# Patient Record
(demographics unavailable — no encounter records)

---

## 2024-10-08 NOTE — DISCUSSION/SUMMARY
[de-identified] : General Dx Discussion The patient was advised of the diagnosis. The natural history of the pathology was explained in full to the patient in layman's terms. All questions were answered. The risks and benefits of surgical and non-surgical treatment alternatives were explained in full to the patient.  Case Discussed. Transition out of brace.  Continue PT/ HEP.  f/u 6 weeks.    Entered by ROSY Gutierrez acting as scribe. - The documentation recorded by the scribe accurately reflects the service I personally performed and the decisions made by me.

## 2024-10-08 NOTE — HISTORY OF PRESENT ILLNESS
[0] : 0 [Localized] : localized [Rest] : rest [Physical therapy] : physical therapy [Standing] : standing [Full time] : Work status: full time [de-identified] : Patient is 28yo male who comes in today for follow up of his left knee. He has been wearing the brace. He feels ok. He feels better, but he still has some pain. He is going to PT. Also recently had bloodwork done which was negative for Lyme Disease.   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

## 2024-11-05 NOTE — PHYSICAL EXAM
[Left] : left knee [NL (0)] : extension 0 degrees [5___] : hamstring 5[unfilled]/5 [Negative] : negative anterior draw [] : patient ambulates without assistive device [TWNoteComboBox7] : flexion 120 degrees

## 2024-12-19 NOTE — PHYSICAL EXAM
[Left] : left knee [NL (0)] : extension 0 degrees [5___] : hamstring 5[unfilled]/5 [Negative] : negative anterior draw [] : no pain with varus stress [TWNoteComboBox7] : flexion 120 degrees

## 2024-12-19 NOTE — DISCUSSION/SUMMARY
[de-identified] : General Dx Discussion The patient was advised of the diagnosis. The natural history of the pathology was explained in full to the patient in layman's terms. All questions were answered. The risks and benefits of surgical and non-surgical treatment alternatives were explained in full to the patient.  Case Discussed. He continues to have pain despite conservative treatment. (bracing, medications, PT).   Due to continued pain will get an MR Arthrogram for further evaluation.  f/u after arthrogram.   Entered by Meeta GALLEGOS acting as a scribe. Instructions: Dr. Rivero- The documentation recorded by the scribe accurately reflects the service I personally performed and the decisions made by me.

## 2024-12-19 NOTE — HISTORY OF PRESENT ILLNESS
[7] : 7 [2] : 2 [Burning] : burning [Dull/Aching] : dull/aching [Constant] : constant [Household chores] : household chores [Leisure] : leisure [Work] : work [Full time] : Work status: full time [de-identified] : Patient is here for a follow up on his left knee. He continues to have pain and pressure into his knee. He feels it is unstable. He was only approved for 3 more sessions of PT.  [] : no [FreeTextEntry1] : Left knee [FreeTextEntry9] : knee sleeve [de-identified] : activity [de-identified] : HEP

## 2025-01-14 NOTE — DATA REVIEWED
[MRI] : MRI [Left] : left [Knee] : knee [Report was reviewed and noted in the chart] : The report was reviewed and noted in the chart [FreeTextEntry1] : Mild lateral subluxation of the patella with soft tissue edema in the superolateral aspect of Hoffa's fat pad. These findings may be seen in the setting of patellar maltracking.   No evidence for meniscal or ligament tears

## 2025-01-14 NOTE — HISTORY OF PRESENT ILLNESS
[4] : 4 [0] : 0 [Localized] : localized [Rest] : rest [Full time] : Work status: full time [de-identified] : Here for f/u left knee arthrogram results. He is feeling a little better. He stopped wearing the brace at work.  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

## 2025-01-14 NOTE — DISCUSSION/SUMMARY
[de-identified] : General Dx Discussion The patient was advised of the diagnosis. The natural history of the pathology was explained in full to the patient in layman's terms. All questions were answered. The risks and benefits of surgical and non-surgical treatment alternatives were explained in full to the patient.  Case Discussed. MR Arthrogram reviewed.  Recommend Anna brace at this time.  HEP. f/u as needed.   Entered by Meeta GALLEGOS acting as a scribe. Instructions: Dr. Rivero- The documentation recorded by the scribe accurately reflects the service I personally performed and the decisions made by me.

## 2025-02-10 NOTE — PHYSICAL EXAM
PT DAILY TREATMENT NOTE     Patient Name: Kelly Avila  Date:2/10/2025  : 1955  [x]  Patient  Verified  Payor: [employer]ICARE / Plan: [employer] CHOICE-PPO MEDICARE / Product Type: *No Product type* /    In time:1349  Out time:1445  Total Treatment Time (min): 56  Total Timed Codes (min): 46  1:1 Treatment Time (min): 46  Visit #: 6 of 10    Treatment Area: Fracture of unspecified part of scapula, right shoulder, subsequent encounter for fracture with routine healing [S42.101D]    SUBJECTIVE  Pt enters with complaint of no pain.  Reporting that she feels like the shoulder is doing better. \"I can move my arm better and don not have the pain like before.\"    Pt had recently missed her follow up ortho appointment with Dr. Maldonado at [facility] [phone]. She is call and schedule another appointment.      Pain Level (0-10 scale): 0/10    Any medication changes, allergies to medications, adverse drug reactions, diagnosis change, or new procedure performed?: [x] No    [] Yes (see summary sheet for update)        OBJECTIVE  Modality rationale: Vaso compression applied at end of treatment in effort to reduce pain and optimally heal with improved mobility.    Min Type Additional Details    [] Estim: []Att   []Unatt  []TENS instruct                 []IFC  []Premod []NMES                       []Other:  []w/US   []w/ice   []w/heat  Position:  Location:    []  Traction: [] Cervical       []Lumbar                       [] Prone          []Supine                       []Intermittent   []Continuous Lbs:  [] before manual  [] after manual    []  Ultrasound: []Continuous   [] Pulsed                           []1MHz   []3MHz Location:  W/cm2:    []  Iontophoresis with dexamethasone         Location: [] Take home patch   [] In clinic    []  Ice     []  heat  []  Ice massage Position:   Location:    10 [x]  Vasopneumatic Device Pressure: [x] lo [] med [] hi   Temp: [x] lo [] med [] hi   [x] Skin  [Left] : left knee [NL (0)] : extension 0 degrees [5___] : hamstring 5[unfilled]/5 [Equivocal] : equivocal Edilberto [Positive] : positive anterior draw [] : no atrophy [TWNoteComboBox7] : flexion 110 degrees